# Patient Record
Sex: MALE | Employment: FULL TIME | URBAN - METROPOLITAN AREA
[De-identification: names, ages, dates, MRNs, and addresses within clinical notes are randomized per-mention and may not be internally consistent; named-entity substitution may affect disease eponyms.]

---

## 2018-10-04 ENCOUNTER — RECORDS - HEALTHEAST (OUTPATIENT)
Dept: LAB | Facility: CLINIC | Age: 38
End: 2018-10-04

## 2018-10-04 LAB — HIV 1+2 AB+HIV1 P24 AG SERPL QL IA: NEGATIVE

## 2018-10-05 LAB
C TRACH DNA SPEC QL PROBE+SIG AMP: NEGATIVE
N GONORRHOEA DNA SPEC QL NAA+PROBE: NEGATIVE

## 2023-04-03 NOTE — PROGRESS NOTES
"  [facility]     CC: Establishing care      HPI: Isidoro Parr is a 42 year old male PMHx of HTN, type II DM, and ANNE (previously on CPAP, but not compliant), presents today for establishing care.    He was diagnosed with HTN when he was a kid, and used to take amlodipine 10 mg qday, lisinopril 30 mg qday, hydrochlorothiazide 12.5 mg qday, and metformin 750 mg qday, but stopped taking them over the past 4-6 months after moving to [place]. He's not sure about the insurance coverage. Otherwise, denies fever, chest pain, shortness of breath, dyspnea on exertion, cough, PND, orthopnea, LE edema, palpitations, syncope or near-syncope, hemoptysis, nausea, vomiting, abdominal pain, melena, hematochezia, constipation, diarrhea, dysuria, hematuria, headaches, local weakness, numbness/tingling of hands/feet.      ROS:  10 point ROS neg other than the symptoms noted above in the HPI.       RHM:     Vaccinations:   COVID-19: 2 Doses (sometime in 2022, Reinaldo 3/22/2021 and Spikevax 11/22/2021). Due for a booster   Influenza: 1/9/2020. Due now   PPSV23 (routine 65y or <65 with chronic) or PCV13: not indicated   Tetanus (1 Tdap then td/tdap q10y): 7/17/2016   Shingrix (2 doses for 50y+): not indicated  Screening   No cancer screening needed at this point  Diet: regular  Exercise: denies  Lifestyle: tobacco use: e-cigarettes and 2-3 cigarettes a day. Alcohol use: denies. Recreational drug use: denies.  Sleep: 6-7 hrs/night  Living situation: lives by himself        No past medical history on file.  No past surgical history on file.  No family history on file.     No current outpatient medications on file.      Not on File      BP (!) 166/101   Pulse 93   Ht 1.854 m (6' 1\")   Wt 121.6 kg (268 lb)   SpO2 97%   BMI 35.36 kg/m      Physical Examination:    General Appearance: AOx3, no clubbing/cyanosis, no edema, no JVD   HEENT: PERRL, EOMI, no pharyngeal erythema  Respiratory: CTAB, no wheezing, no " crackles  Cardiovascular: RRR, normal S1/S2, no murmur  GI: no distension, normoactive bowel sounds, soft, not tender, no rebound tenderness or guarding, no hepatosplenomegaly  Genitourinary: no CVA tenderness  Skin: no rash  Musculoskeletal: no deformities, no joint swelling, no pitting edema bilaterally   Neurologic: CN grossly intact, no focal neurological deficits, no asterixis        Assessment and Plan:  42 year old male PMHx of HTN, type II DM, and ANNE (previously on CPAP, but not compliant), presents today for establishing care.    # HTN  Used to take amlodipine 10 mg qday, lisinopril 30 mg qday, and hydrochlorothiazide 12.5 mg qday but stopped taking them over the past 4-6 months after moving to [place]. BP today 166/101. He is very nervous about insurance coverage and co-pay.  Plan:  - BMP and urine microalbumin ordered today  - Will try to refill his meds   - amlodipine 10 mg qday   - lisinopril 30 mg qday   - hydrochlorothiazide 12.5 mg qday  - Follow up with me in 2 months for BP control    # Type II DM  Used to take Metformin 750 mg qday. No records on A1c in Epic.  Plan:  - A1c ordered today  - Refill Metformin 750 mg qday  - Follow up with me in 2 months for full diabetes care    # Preventive measures  - Giving Bivalent COVID-19 booster today      Follow up with me in 2 months for BP and diabetes follow up      This patient was discussed with Dr. Michael Milner MD  Internal Medicine Resident (PGY-2)  [facility]  Pager: [phone]

## 2023-04-06 ENCOUNTER — OFFICE VISIT (OUTPATIENT)
Dept: INTERNAL MEDICINE | Facility: CLINIC | Age: 43
End: 2023-04-06
Payer: COMMERCIAL

## 2023-04-06 ENCOUNTER — LAB (OUTPATIENT)
Dept: LAB | Facility: CLINIC | Age: 43
End: 2023-04-06
Payer: COMMERCIAL

## 2023-04-06 VITALS
BODY MASS INDEX: 35.52 KG/M2 | SYSTOLIC BLOOD PRESSURE: 166 MMHG | DIASTOLIC BLOOD PRESSURE: 101 MMHG | HEART RATE: 93 BPM | HEIGHT: 73 IN | OXYGEN SATURATION: 97 % | WEIGHT: 268 LBS

## 2023-04-06 DIAGNOSIS — E66.01 MORBID OBESITY (H): ICD-10-CM

## 2023-04-06 DIAGNOSIS — I10 BENIGN ESSENTIAL HYPERTENSION: ICD-10-CM

## 2023-04-06 DIAGNOSIS — Z29.9 PREVENTIVE MEASURE: ICD-10-CM

## 2023-04-06 DIAGNOSIS — E11.9 TYPE 2 DIABETES MELLITUS WITHOUT COMPLICATION, WITHOUT LONG-TERM CURRENT USE OF INSULIN (H): ICD-10-CM

## 2023-04-06 DIAGNOSIS — G47.33 OSA (OBSTRUCTIVE SLEEP APNEA): ICD-10-CM

## 2023-04-06 DIAGNOSIS — I10 BENIGN ESSENTIAL HYPERTENSION: Primary | ICD-10-CM

## 2023-04-06 DIAGNOSIS — E78.5 DYSLIPIDEMIA: ICD-10-CM

## 2023-04-06 LAB
ANION GAP SERPL CALCULATED.3IONS-SCNC: 8 MMOL/L (ref 7–15)
BUN SERPL-MCNC: 16.6 MG/DL (ref 6–20)
CALCIUM SERPL-MCNC: 9.5 MG/DL (ref 8.6–10)
CHLORIDE SERPL-SCNC: 103 MMOL/L (ref 98–107)
CREAT SERPL-MCNC: 0.93 MG/DL (ref 0.67–1.17)
DEPRECATED HCO3 PLAS-SCNC: 31 MMOL/L (ref 22–29)
GFR SERPL CREATININE-BSD FRML MDRD: >90 ML/MIN/1.73M2
GLUCOSE SERPL-MCNC: 103 MG/DL (ref 70–99)
HBA1C MFR BLD: 6.1 %
POTASSIUM SERPL-SCNC: 4.4 MMOL/L (ref 3.4–5.3)
SODIUM SERPL-SCNC: 142 MMOL/L (ref 136–145)

## 2023-04-06 PROCEDURE — 83036 HEMOGLOBIN GLYCOSYLATED A1C: CPT | Performed by: PATHOLOGY

## 2023-04-06 PROCEDURE — 0124A COVID-19 VACCINE BIVALENT BOOSTER 12+ (PFIZER): CPT

## 2023-04-06 PROCEDURE — 99386 PREV VISIT NEW AGE 40-64: CPT | Mod: 25

## 2023-04-06 PROCEDURE — 36415 COLL VENOUS BLD VENIPUNCTURE: CPT | Performed by: PATHOLOGY

## 2023-04-06 PROCEDURE — 99000 SPECIMEN HANDLING OFFICE-LAB: CPT | Performed by: PATHOLOGY

## 2023-04-06 PROCEDURE — 80048 BASIC METABOLIC PNL TOTAL CA: CPT | Performed by: PATHOLOGY

## 2023-04-06 PROCEDURE — 91312 COVID-19 VACCINE BIVALENT BOOSTER 12+ (PFIZER): CPT

## 2023-04-06 RX ORDER — ATORVASTATIN CALCIUM 10 MG/1
10 TABLET, FILM COATED ORAL DAILY
Qty: 90 TABLET | Refills: 3 | Status: SHIPPED | OUTPATIENT
Start: 2023-04-06

## 2023-04-06 RX ORDER — HYDROCHLOROTHIAZIDE 12.5 MG/1
CAPSULE ORAL
COMMUNITY
Start: 2022-05-17 | End: 2023-04-06

## 2023-04-06 RX ORDER — LISINOPRIL 30 MG/1
TABLET ORAL
COMMUNITY
Start: 2022-05-17 | End: 2023-04-06

## 2023-04-06 RX ORDER — AMLODIPINE BESYLATE 10 MG/1
TABLET ORAL
COMMUNITY
Start: 2022-05-17 | End: 2023-04-06

## 2023-04-06 RX ORDER — ATORVASTATIN CALCIUM 10 MG/1
TABLET, FILM COATED ORAL
COMMUNITY
Start: 2022-05-17 | End: 2023-04-06

## 2023-04-06 RX ORDER — METFORMIN HYDROCHLORIDE 750 MG/1
750 TABLET, EXTENDED RELEASE ORAL
Qty: 90 TABLET | Refills: 3 | Status: SHIPPED | OUTPATIENT
Start: 2023-04-06

## 2023-04-06 RX ORDER — AMLODIPINE BESYLATE 10 MG/1
10 TABLET ORAL DAILY
Qty: 90 TABLET | Refills: 3 | Status: SHIPPED | OUTPATIENT
Start: 2023-04-06

## 2023-04-06 RX ORDER — METFORMIN HYDROCHLORIDE 750 MG/1
TABLET, EXTENDED RELEASE ORAL
COMMUNITY
Start: 2022-05-17 | End: 2023-04-06

## 2023-04-06 RX ORDER — LISINOPRIL 30 MG/1
30 TABLET ORAL DAILY
Qty: 90 TABLET | Refills: 3 | Status: SHIPPED | OUTPATIENT
Start: 2023-04-06

## 2023-04-06 RX ORDER — HYDROCHLOROTHIAZIDE 12.5 MG/1
12.5 CAPSULE ORAL EVERY MORNING
Qty: 90 CAPSULE | Refills: 3 | Status: SHIPPED | OUTPATIENT
Start: 2023-04-06

## 2023-04-06 NOTE — NURSING NOTE
Isidoro Parr received the bivalent Covid booster (Pfizer) in clinic today at the request of Dr. Milner. The immunization site was cleaned with an alcohol prep wipe. The immunization was given without incident--see immunization list for administration details. No swelling or redness was observed at the site of injection after the immunization was given. Pt has no history of reaction to vaccines. [place] [facility] [place] in case of an adverse reaction.     JOSE EDUARDO Cox at 9:37 AM on 4/6/2023

## 2023-04-06 NOTE — PROGRESS NOTES
"I, Tray Rodriguez MD saw the patient with the resident, and agree with the resident's findings and plan of care as documented in the resident's note.  BP (!) 166/101   Pulse 93   Ht 1.854 m (6' 1\")   Wt 121.6 kg (268 lb)   SpO2 97%   BMI 35.36 kg/m    I personally reviewed vital signs and past record.  Key findings: multiple chronic medical problems, with limited records  -- try for med restarting  -- bring back for recheck in 2 months (also will do result follow-up).  -- He is a candidate on [employer] IF he can lose weight enough to get to a BMI of 32 or less.       "

## 2023-04-06 NOTE — NURSING NOTE
Isidoro Parr is a 42 year old male patient that presents today in clinic for the following:    Chief Complaint   Patient presents with     Physical     The patient's allergies and medications were reviewed as noted. A set of vitals were recorded as noted without incident. The patient does not have any other questions for the provider.    Jessika Tenorio, EMT at 8:59 AM on 4/6/2023

## 2023-05-14 ENCOUNTER — HEALTH MAINTENANCE LETTER (OUTPATIENT)
Age: 43
End: 2023-05-14

## 2023-08-06 ENCOUNTER — HEALTH MAINTENANCE LETTER (OUTPATIENT)
Age: 43
End: 2023-08-06

## 2023-12-24 ENCOUNTER — HEALTH MAINTENANCE LETTER (OUTPATIENT)
Age: 43
End: 2023-12-24

## 2024-05-12 ENCOUNTER — HEALTH MAINTENANCE LETTER (OUTPATIENT)
Age: 44
End: 2024-05-12

## 2024-07-21 ENCOUNTER — HEALTH MAINTENANCE LETTER (OUTPATIENT)
Age: 44
End: 2024-07-21

## 2024-09-29 ENCOUNTER — HEALTH MAINTENANCE LETTER (OUTPATIENT)
Age: 44
End: 2024-09-29

## 2025-01-18 ENCOUNTER — HEALTH MAINTENANCE LETTER (OUTPATIENT)
Age: 45
End: 2025-01-18

## 2025-04-27 ENCOUNTER — HEALTH MAINTENANCE LETTER (OUTPATIENT)
Age: 45
End: 2025-04-27

## 2025-08-10 ENCOUNTER — HEALTH MAINTENANCE LETTER (OUTPATIENT)
Age: 45
End: 2025-08-10